# Patient Record
Sex: FEMALE | Race: WHITE | NOT HISPANIC OR LATINO | Employment: OTHER | URBAN - METROPOLITAN AREA
[De-identification: names, ages, dates, MRNs, and addresses within clinical notes are randomized per-mention and may not be internally consistent; named-entity substitution may affect disease eponyms.]

---

## 2018-10-16 ENCOUNTER — APPOINTMENT (OUTPATIENT)
Dept: RADIOLOGY | Facility: MEDICAL CENTER | Age: 53
End: 2018-10-16
Attending: INTERNAL MEDICINE
Payer: COMMERCIAL

## 2018-11-06 ENCOUNTER — HOSPITAL ENCOUNTER (OUTPATIENT)
Dept: RADIOLOGY | Facility: MEDICAL CENTER | Age: 53
End: 2018-11-06
Attending: INTERNAL MEDICINE
Payer: COMMERCIAL

## 2018-11-06 VITALS
HEIGHT: 63 IN | DIASTOLIC BLOOD PRESSURE: 74 MMHG | SYSTOLIC BLOOD PRESSURE: 118 MMHG | RESPIRATION RATE: 18 BRPM | WEIGHT: 120 LBS | HEART RATE: 80 BPM | OXYGEN SATURATION: 94 % | BODY MASS INDEX: 21.26 KG/M2

## 2018-11-06 DIAGNOSIS — I42.9 CARDIOMYOPATHY, UNSPECIFIED TYPE (HCC): ICD-10-CM

## 2018-11-06 DIAGNOSIS — Z01.818 PRE-TRANSPLANT EVALUATION FOR HEART TRANSPLANT: ICD-10-CM

## 2018-11-06 PROCEDURE — 75574 CT ANGIO HRT W/3D IMAGE: CPT

## 2018-11-06 PROCEDURE — 700101 HCHG RX REV CODE 250: Performed by: RADIOLOGY

## 2018-11-06 PROCEDURE — 700117 HCHG RX CONTRAST REV CODE 255: Performed by: INTERNAL MEDICINE

## 2018-11-06 RX ORDER — METOPROLOL TARTRATE 100 MG/1
100 TABLET ORAL ONCE
Status: DISCONTINUED | OUTPATIENT
Start: 2018-11-06 | End: 2018-11-07 | Stop reason: HOSPADM

## 2018-11-06 RX ORDER — METOPROLOL TARTRATE 1 MG/ML
INJECTION, SOLUTION INTRAVENOUS
Status: DISCONTINUED
Start: 2018-11-06 | End: 2018-11-07 | Stop reason: HOSPADM

## 2018-11-06 RX ORDER — METOPROLOL TARTRATE 1 MG/ML
5 INJECTION, SOLUTION INTRAVENOUS
Status: COMPLETED | OUTPATIENT
Start: 2018-11-06 | End: 2018-11-06

## 2018-11-06 RX ORDER — NITROGLYCERIN 0.4 MG/1
0.4 TABLET SUBLINGUAL ONCE
Status: DISCONTINUED | OUTPATIENT
Start: 2018-11-06 | End: 2018-11-07 | Stop reason: HOSPADM

## 2018-11-06 RX ORDER — METOPROLOL TARTRATE 1 MG/ML
5 INJECTION, SOLUTION INTRAVENOUS
Status: DISCONTINUED | OUTPATIENT
Start: 2018-11-06 | End: 2018-11-07 | Stop reason: HOSPADM

## 2018-11-06 RX ADMIN — METOPROLOL TARTRATE 5 MG: 5 INJECTION INTRAVENOUS at 15:45

## 2018-11-06 RX ADMIN — METOPROLOL TARTRATE 5 MG: 5 INJECTION INTRAVENOUS at 15:50

## 2018-11-06 RX ADMIN — IOHEXOL 100 ML: 350 INJECTION, SOLUTION INTRAVENOUS at 16:31

## 2018-11-06 RX ADMIN — METOPROLOL TARTRATE 5 MG: 5 INJECTION INTRAVENOUS at 16:00

## 2018-11-06 ASSESSMENT — PAIN SCALES - GENERAL: PAINLEVEL_OUTOF10: 0

## 2018-11-07 NOTE — PROGRESS NOTES
Patient tolerated procedure well without incident. VSS. Discharged to [facility] in stable condition with instructions understood.

## 2019-01-15 ENCOUNTER — TELEPHONE (OUTPATIENT)
Dept: TRANSPLANT | Facility: MEDICAL CENTER | Age: 54
End: 2019-01-15

## 2019-01-15 NOTE — TELEPHONE ENCOUNTER
Spoke to Holley this morning. Introduced pt to the new transplant clinic and my role within the program. Followup scheduled with Dr. Cain. Pt states that she has recent labs via [facility]. Sangita from [facility] to fax over results. Will review with Dr. Cain and order any additional labs/ imaging before followup appointment.

## 2019-01-17 DIAGNOSIS — K76.0 FATTY (CHANGE OF) LIVER, NOT ELSEWHERE CLASSIFIED: ICD-10-CM

## 2019-01-17 DIAGNOSIS — K74.60 HEPATIC CIRRHOSIS, UNSPECIFIED HEPATIC CIRRHOSIS TYPE, UNSPECIFIED WHETHER ASCITES PRESENT (HCC): ICD-10-CM

## 2019-01-17 NOTE — TELEPHONE ENCOUNTER
"Called pt to inform her that orders have been placed for labs and that she will need to get labs done before her appointment with Dr. Cain. I informed Holley that I was able to get her recent labs from [facility], but they were not the same labs needed by Dr. Cain. Pt was confused because she \"had labs done while she was in [place]\". Re-educated patient about meld scores and how they related with recurrent testing.       Pt was also informed that she will need an updated CT scan. Pt states that she is scheduled to have a scan done with [facility] next week.    This RN called [facility] to inquire about the scheduled testing. Pt is scheduled for an abdominal ultrasound. Pt to be updated that she will possibly need a CT scan, not an ultrasound.     Pt called back and informed that the scans are not the same. Pt agreed to have the labs and imaging done that is needed.  to contact patient to work out a time for imaging. Pt verbalized understanding. All questions and concerns addressed.   "

## 2019-01-22 ENCOUNTER — HOSPITAL ENCOUNTER (OUTPATIENT)
Dept: RADIOLOGY | Facility: MEDICAL CENTER | Age: 54
End: 2019-01-22
Attending: INTERNAL MEDICINE
Payer: COMMERCIAL

## 2019-01-22 ENCOUNTER — HOSPITAL ENCOUNTER (OUTPATIENT)
Dept: LAB | Facility: MEDICAL CENTER | Age: 54
End: 2019-01-22
Attending: INTERNAL MEDICINE
Payer: COMMERCIAL

## 2019-01-22 DIAGNOSIS — K76.0 FATTY (CHANGE OF) LIVER, NOT ELSEWHERE CLASSIFIED: ICD-10-CM

## 2019-01-22 DIAGNOSIS — K74.60 HEPATIC CIRRHOSIS, UNSPECIFIED HEPATIC CIRRHOSIS TYPE, UNSPECIFIED WHETHER ASCITES PRESENT (HCC): ICD-10-CM

## 2019-01-22 LAB
ALBUMIN SERPL BCP-MCNC: 3.9 G/DL (ref 3.2–4.9)
ALBUMIN/GLOB SERPL: 1.1 G/DL
ALP SERPL-CCNC: 188 U/L (ref 30–99)
ALT SERPL-CCNC: 17 U/L (ref 2–50)
ANION GAP SERPL CALC-SCNC: 9 MMOL/L (ref 0–11.9)
AST SERPL-CCNC: 51 U/L (ref 12–45)
BASOPHILS # BLD AUTO: 0.9 % (ref 0–1.8)
BASOPHILS # BLD: 0.07 K/UL (ref 0–0.12)
BILIRUB SERPL-MCNC: 2.8 MG/DL (ref 0.1–1.5)
BUN SERPL-MCNC: 10 MG/DL (ref 8–22)
CALCIUM SERPL-MCNC: 9.9 MG/DL (ref 8.5–10.5)
CHLORIDE SERPL-SCNC: 104 MMOL/L (ref 96–112)
CO2 SERPL-SCNC: 24 MMOL/L (ref 20–33)
CREAT SERPL-MCNC: 0.58 MG/DL (ref 0.5–1.4)
EOSINOPHIL # BLD AUTO: 0.09 K/UL (ref 0–0.51)
EOSINOPHIL NFR BLD: 1.2 % (ref 0–6.9)
ERYTHROCYTE [DISTWIDTH] IN BLOOD BY AUTOMATED COUNT: 62 FL (ref 35.9–50)
GLOBULIN SER CALC-MCNC: 3.6 G/DL (ref 1.9–3.5)
GLUCOSE SERPL-MCNC: 128 MG/DL (ref 65–99)
HCT VFR BLD AUTO: 37.2 % (ref 37–47)
HGB BLD-MCNC: 12.3 G/DL (ref 12–16)
IMM GRANULOCYTES # BLD AUTO: 0.02 K/UL (ref 0–0.11)
IMM GRANULOCYTES NFR BLD AUTO: 0.3 % (ref 0–0.9)
INR PPP: 1.31 (ref 0.87–1.13)
LYMPHOCYTES # BLD AUTO: 1.83 K/UL (ref 1–4.8)
LYMPHOCYTES NFR BLD: 24.4 % (ref 22–41)
MCH RBC QN AUTO: 34.6 PG (ref 27–33)
MCHC RBC AUTO-ENTMCNC: 33.1 G/DL (ref 33.6–35)
MCV RBC AUTO: 104.8 FL (ref 81.4–97.8)
MONOCYTES # BLD AUTO: 1.07 K/UL (ref 0–0.85)
MONOCYTES NFR BLD AUTO: 14.2 % (ref 0–13.4)
NEUTROPHILS # BLD AUTO: 4.43 K/UL (ref 2–7.15)
NEUTROPHILS NFR BLD: 59 % (ref 44–72)
NRBC # BLD AUTO: 0 K/UL
NRBC BLD-RTO: 0 /100 WBC
PLATELET # BLD AUTO: 165 K/UL (ref 164–446)
PMV BLD AUTO: 9.6 FL (ref 9–12.9)
POTASSIUM SERPL-SCNC: 3.8 MMOL/L (ref 3.6–5.5)
PROT SERPL-MCNC: 7.5 G/DL (ref 6–8.2)
PROTHROMBIN TIME: 16.4 SEC (ref 12–14.6)
RBC # BLD AUTO: 3.55 M/UL (ref 4.2–5.4)
SODIUM SERPL-SCNC: 137 MMOL/L (ref 135–145)
WBC # BLD AUTO: 7.5 K/UL (ref 4.8–10.8)

## 2019-01-22 PROCEDURE — 700117 HCHG RX CONTRAST REV CODE 255: Performed by: INTERNAL MEDICINE

## 2019-01-22 PROCEDURE — 85610 PROTHROMBIN TIME: CPT

## 2019-01-22 PROCEDURE — 36415 COLL VENOUS BLD VENIPUNCTURE: CPT

## 2019-01-22 PROCEDURE — 85025 COMPLETE CBC W/AUTO DIFF WBC: CPT

## 2019-01-22 PROCEDURE — 80053 COMPREHEN METABOLIC PANEL: CPT

## 2019-01-22 PROCEDURE — 74170 CT ABD WO CNTRST FLWD CNTRST: CPT

## 2019-01-22 RX ADMIN — IOHEXOL 100 ML: 350 INJECTION, SOLUTION INTRAVENOUS at 09:38

## 2019-01-23 ENCOUNTER — OFFICE VISIT (OUTPATIENT)
Dept: TRANSPLANT | Facility: MEDICAL CENTER | Age: 54
End: 2019-01-23
Payer: COMMERCIAL

## 2019-01-23 VITALS
WEIGHT: 130 LBS | OXYGEN SATURATION: 97 % | RESPIRATION RATE: 16 BRPM | DIASTOLIC BLOOD PRESSURE: 58 MMHG | SYSTOLIC BLOOD PRESSURE: 105 MMHG | TEMPERATURE: 99.6 F | HEART RATE: 78 BPM | BODY MASS INDEX: 23.03 KG/M2

## 2019-01-23 DIAGNOSIS — Z95.828 S/P TIPS (TRANSJUGULAR INTRAHEPATIC PORTOSYSTEMIC SHUNT): ICD-10-CM

## 2019-01-23 DIAGNOSIS — E44.1 MILD PROTEIN-CALORIE MALNUTRITION (HCC): ICD-10-CM

## 2019-01-23 DIAGNOSIS — K70.30 ALCOHOLIC CIRRHOSIS OF LIVER WITHOUT ASCITES (HCC): Primary | ICD-10-CM

## 2019-01-23 PROCEDURE — 99214 OFFICE O/P EST MOD 30 MIN: CPT | Performed by: INTERNAL MEDICINE

## 2019-01-23 RX ORDER — PAROXETINE HYDROCHLORIDE 20 MG/1
20 TABLET, FILM COATED ORAL DAILY
COMMUNITY
Start: 2017-12-18

## 2019-01-23 RX ORDER — SIMVASTATIN 20 MG
20 TABLET ORAL DAILY
COMMUNITY

## 2019-01-23 RX ORDER — FUROSEMIDE 40 MG/1
40 TABLET ORAL DAILY
COMMUNITY
Start: 2018-02-12

## 2019-01-23 RX ORDER — MONTELUKAST SODIUM 10 MG/1
10 TABLET ORAL DAILY
COMMUNITY
Start: 2017-10-17

## 2019-01-23 RX ORDER — SPIRONOLACTONE 100 MG/1
100 TABLET, FILM COATED ORAL DAILY
COMMUNITY
Start: 2018-02-12

## 2019-01-23 NOTE — PROGRESS NOTES
"  Liver Transplant follow up Clinic Note    1/23/2019     Referring Provider: Brant Hernandez MD, Dr. Melendez  Primary Care Provider: Jarret Morrison M.D.    History of Present Illness:  Holley Maagña is a 53 y.o. female who presents today for follow up in the transplant hepatology clinic. She is unaccompanied      She underwent OLTE due to decompensated ETOH cirrhosis with refractory ascites and severe muscle wasting, s/p TIPS at [facility] 12/31/17  With coils of GV, Last para feb 2018    Pt was presented for consideration of listing 3/2018 and was deferred for the following:   \" cardiology consult for transplant clearance. Psychosocially she is not cleared for transplant. To improve her candidacy:  1. She must completely stop drinking alcohol, non prescription drugs and must demonstrate 6 months of abstinence.   2. She must engage in relapse prevention plan such as  AA meetings and submit signed logs or sobriety counseling at least twice weekly.  3. She must have random toxicology screens with negative results, and provide medical marijuana card if she continues to use marijuana. \"    Pt has not been seen by liver transplant since 3/2018    In the interim:   She was seen by cardiology at [facility], who recommended CT coronary angiogram due to cardiomyopathy, but this test was non diagnostic    Pt states she has been seen by her primary GI Dr. Melendez and has had and US to verify TIPS patency     She has not had any hospitalizations since last seen, no HE, no falls  She chris'd at 95lbs after TIPS, but has had slow weight gain, not fluid.  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     She has not been attending AA regularly, partly due to SW at [facility] having left and pt not given adequate f/u. She states she has attended AA monthly - but did not have any signatures    She has good energy, notes easy bruising, no CP or " SOB  Notices some disequilibrium and mild neuropathy in feet BL    Review of Systems:   Complete organ system review performed, positives noted in HPI    Past Medical History:  ETOH liver disease  Ascites  S/p TIPS,  Hx of hyponatremia with seizures  No abdominal surgeries    Past Surgical History:  none      Current Outpatient Prescriptions:   •  furosemide (LASIX) 40 MG Tab, Take 40 mg by mouth every day., Disp: , Rfl:   •  montelukast (SINGULAIR) 10 MG Tab, Take 10 mg by mouth every day., Disp: , Rfl:   •  PARoxetine (PAXIL) 20 MG Tab, Take 20 mg by mouth every day., Disp: , Rfl:   •  spironolactone (ALDACTONE) 100 MG Tab, Take 100 mg by mouth every day., Disp: , Rfl:   •  simvastatin (ZOCOR) 20 MG Tab, Take 20 mg by mouth every day., Disp: , Rfl:     Need to verify diuretic dosing -   MVI  Probiotics  ASA 81mg  cavediolol - BID - need to verify dose    Allergies: No Known Allergies      Social History:   Soc:  Worked for [employer] as an  - terminated November 2016  Has not worked since then  No TOB, never smoker  Liver  son daughter in law and granddaughter  Not clear about amount of ETOH she drank, seems to minimize, kept drinking a few drinks even after told of liver disease     Caregivers would be:   Brother who lives in [place] would relocate and live with pt for the initial 6 weeks.and many friends and adult children to help      Family History:  Unknown, pt is adopted    Physical Examination:  Vitals:    01/23/19 1009   BP: 105/58   Pulse: 78   Resp: 16   Temp: 37.6 °C (99.6 °F)   SpO2: 97%     Body mass index is 23.03 kg/m².    Consitutional: alert, pleasant, cooperative  Eyes: anicteric, normal conjunctivae, chronically ill appearing with proximal muscle wating  HENT: NET oropharynx clear, normal dentition  Neck: supple, no jvd, no hepatojugular reflex, no MANDI  Cardiovascular: normal rate and rhythm, normal heart sounds, TAMY LSB  Pulmonary: normal breath sounds, normal respiratory  effort  Abdominal: soft, nontender, nondistended, bowel sounds present, no hepatosplenomegaly, no ascites  Musculoskeletal: no edema  Neurological: alert, oriented x 3, no focal deficits, no asterixis, mildly wide based gait and station  Skin: no jaundice, no spider angiomata, no rashes, no palmar erythema   Psych: normal mood and affect    Labs:    CMP:   Results for SUSAN COLES (MRN [id number]) as of 1/23/2019 09:38   Ref. Range 1/22/2019 08:59   Sodium Latest Ref Range: 135 - 145 mmol/L 137   Potassium Latest Ref Range: 3.6 - 5.5 mmol/L 3.8   Chloride Latest Ref Range: 96 - 112 mmol/L 104   Co2 Latest Ref Range: 20 - 33 mmol/L 24   Anion Gap Latest Ref Range: 0.0 - 11.9  9.0   Glucose Latest Ref Range: 65 - 99 mg/dL 128 (H)   Bun Latest Ref Range: 8 - 22 mg/dL 10   Creatinine Latest Ref Range: 0.50 - 1.40 mg/dL 0.58   GFR If  Latest Ref Range: >60 mL/min/1.73 m 2 >60   GFR If Non  Latest Ref Range: >60 mL/min/1.73 m 2 >60   Calcium Latest Ref Range: 8.5 - 10.5 mg/dL 9.9   AST(SGOT) Latest Ref Range: 12 - 45 U/L 51 (H)   ALT(SGPT) Latest Ref Range: 2 - 50 U/L 17   Alkaline Phosphatase Latest Ref Range: 30 - 99 U/L 188 (H)   Total Bilirubin Latest Ref Range: 0.1 - 1.5 mg/dL 2.8 (H)   Albumin Latest Ref Range: 3.2 - 4.9 g/dL 3.9   Total Protein Latest Ref Range: 6.0 - 8.2 g/dL 7.5   Globulin Latest Ref Range: 1.9 - 3.5 g/dL 3.6 (H)   Results for SUSAN COLES (MRN [id number]) as of 1/23/2019 09:38   Ref. Range 1/22/2019 08:59   WBC Latest Ref Range: 4.8 - 10.8 K/uL 7.5   RBC Latest Ref Range: 4.20 - 5.40 M/uL 3.55 (L)   Hemoglobin Latest Ref Range: 12.0 - 16.0 g/dL 12.3   Hematocrit Latest Ref Range: 37.0 - 47.0 % 37.2   MCV Latest Ref Range: 81.4 - 97.8 fL 104.8 (H)   MCH Latest Ref Range: 27.0 - 33.0 pg 34.6 (H)   MCHC Latest Ref Range: 33.6 - 35.0 g/dL 33.1 (L)   RDW Latest Ref Range: 35.9 - 50.0 fL 62.0 (H)   Platelet Count Latest Ref Range: 164 - 446 K/uL 165     AFP  7.4    PT/INR:   Lab Results   Component Value Date/Time    PROTHROMBTM 16.4 (H) 01/22/2019 08:59 AM    INR 1.31 (H) 01/22/2019 08:59 AM       Endoscopy:      Imaging/Studies:  CT abdomen 1/22/19  Findings suspicious for large posterior medial right lobe of the liver mass and associated portal venous branch thrombosis. MRI is consideration for further evaluation.  Cannot exclude infiltrate masses within the dome of the liver and anterior left lobe of the liver.  Opacified portacaval shunt. Splenic and gastroesophageal varices demonstrated.  No free fluid within the mid and upper abdomen.  5.7 mm hypodensity right kidney likely renal cyst. No hydronephrosis.  Cholelithiasis.  Atherosclerotic changes.    Assessment: Holley Magaña is a 53 y.o. female who presents today for follow up in the liver transplant clinic, pt currently deferred.  1. Cirrhosis. MELD-Na 13 - discussed pt has low MELD, has stability of liver disease with TIPS and ETOH cessation. Will complete OLTE  2. S/p TIPS - good control of fluid  3. ETOH abstinence -  online AA  4. Hepatoma screening - last scan 1/22/19 -  5. Cardiomyopathy - currently remains asymptomatic - has not completed full pre transplant cardiac evaluation.  Plan:  Abnormal finding on CT from [facility], query malignancy, vs nodular regeneration    Needs ABD US to evaluate TIPs flow - outside records [facility]    Needs to f/u with SW and AA logs -     Need to discuss with cardiology further evaluation of cardiomyopathy for transplant evaluation given inadequacy of CT coronary angiogram  Will repeat TTE now to re evaluate RVSP, given last ECHO was with ascites/pleural effusion    Needs nutrition f/u    Pt will be rediscussed at [facility] selection after ECHO and discussion with [facility] cardiology.     Currently MELD remains low - but pt remains frail with potential for decompensation. Discussed with pt that should she complete her OLTevaluation and have adequate cardiac clearance, she  could be listed with low MELD, or followed closely not on the list, and only listed if she shows future signs of decompensation      Makenzie Cain M.D.  Clinical

## 2019-01-23 NOTE — PATIENT INSTRUCTIONS
Follow-Up Action Items from Today’s Visit      What we discussed today:  1. Summary of Visit: Dr. Cain and Addie will contact you regarding future studies and transplant listing    2. Medication Changes: none   3. Blood work every 3 months   4. Liver cancer screening with CT scan of the liver every 6 months   5. Please have Dr. Melendez send us the Ultrasound and all notes from his visit with you          Please do the following before your next visit:  1. Labs: Get your labs done every 3 months   2. Radiology studies needed: CT scan every 6 months for cancer screening  3. Next Appointment: Please schedule in 3 months          For any questions, please call the Liver Transplant Office to reach your [facility] coordinator: [phone], For urgent issues after business hours or on weekends, please call the on-call [facility] through the [facility] : [phone]    To be best prepared for your next visit, please:  • Arrive 15 minutes before your next clinic appointment.  • Write down questions for your doctor in the space provided below   • Keep an updated list of your medications with you at all times. If you have any new medications or if you need any additional refills before your next visit, please call our office to inform us.   • Call our office if you have any tests done outside of [facility] so we can review with you.  • Call our office if you are hospitalized or go to the Emergency Department.  • Call our office before your visit if there are any forms such as disability applications that you need completed.       Your top questions for next visit:    1. _____________________________________________________________________    2. _____________________________________________________________________    3. _____________________________________________________________________

## 2019-01-24 ENCOUNTER — TELEPHONE (OUTPATIENT)
Dept: TRANSPLANT | Facility: MEDICAL CENTER | Age: 54
End: 2019-01-24

## 2019-01-24 NOTE — TELEPHONE ENCOUNTER
Patient called to confirm medication list from yesterday. Reviewed MD recommendations from clinic visit yesterday. Pt states that she has called [facility] to send records to [facility]. Follow-up appointment scheduled with patient. Will follow-up with patient regarding additional outstanding needs.

## 2019-01-24 NOTE — PROGRESS NOTES
Pt seen in clinic. Vital signs assessed, medical/ surgical history taken, current medications reviewed, and current complaints discussed. Pt seen by Dr. Cain and recommendations include:  Ordering new iron panel and an echo with bubble study.     This RN is also going to followup with the patients current [place]  assigned to her case to review recommendations regarding how many AA meetings are needed per week. Patients states that the online AA meetings that were provided for her to take are costing money. Will also follow-up regarding that concern with SW.    Pt [facility]. Pt advised to sign ANNALISA with them to release her records. This RN to obtain outside records from [facility].    Pt to f/u in clinic with Dr. Cain in 3 months.     Orders placed per MD request.

## 2019-01-28 ENCOUNTER — TELEPHONE (OUTPATIENT)
Dept: TRANSPLANT | Facility: MEDICAL CENTER | Age: 54
End: 2019-01-28

## 2019-01-28 NOTE — TELEPHONE ENCOUNTER
Previous to the call:    This RN had spoke with Mely ([place] ) about the questions that had occurred during the followup visit. Holley was unsure how many AA meetings were required per week and had mentioned that the links that were sent to her via email were not free. Mely states that Daya is required to attend two meetings a week. She will send Holley an email to clarify the  needs and requirements and provide an additional link to online AA meetings.    This RN called Holley and left a voicemail with the above information. Contact information was provided and patient encouraged to call back with any further questions or concerns.

## 2019-02-06 ENCOUNTER — TELEPHONE (OUTPATIENT)
Dept: TRANSPLANT | Facility: MEDICAL CENTER | Age: 54
End: 2019-02-06

## 2019-02-06 NOTE — TELEPHONE ENCOUNTER
Holley called in today to confirm tests to be complete and the days/ times. She states she will be getting an abdominal ultrasound via her GI MD. Requested patient have results faxed to the RN after completion. Pt also states that she has not begun the online AA meetings. Pt advised to begin as soon as she can as she will not be listed until she is compliant with SW recommendations. Pt verbalized understanding. All questions and concerns addressed. Pt encouraged to call with any questions or concerns.

## 2019-02-20 DIAGNOSIS — K70.31 ALCOHOLIC CIRRHOSIS OF LIVER WITH ASCITES (HCC): ICD-10-CM

## 2019-02-25 NOTE — PROGRESS NOTES
Spoke to Holley to discuss testing needing to be complete. Pt states that she rescheduled her echo for Thursday and she will get labs done during that visit. Informed her that the radiology conference has recommended adding an MRI to further assess her liver and that a  will be reaching out to her to schedule. Additionally, pt states that she has been compliant with AA meetings as directed by the .

## 2019-02-28 ENCOUNTER — APPOINTMENT (OUTPATIENT)
Dept: CARDIOLOGY | Facility: MEDICAL CENTER | Age: 54
End: 2019-02-28
Attending: INTERNAL MEDICINE
Payer: COMMERCIAL

## 2019-04-02 ENCOUNTER — HOSPITAL ENCOUNTER (OUTPATIENT)
Dept: RADIOLOGY | Facility: MEDICAL CENTER | Age: 54
End: 2019-04-02
Attending: INTERNAL MEDICINE
Payer: MEDICARE

## 2019-04-02 ENCOUNTER — HOSPITAL ENCOUNTER (OUTPATIENT)
Dept: CARDIOLOGY | Facility: MEDICAL CENTER | Age: 54
End: 2019-04-02
Attending: INTERNAL MEDICINE
Payer: MEDICARE

## 2019-04-02 DIAGNOSIS — K70.30 ALCOHOLIC CIRRHOSIS OF LIVER WITHOUT ASCITES (HCC): ICD-10-CM

## 2019-04-02 DIAGNOSIS — K70.31 ALCOHOLIC CIRRHOSIS OF LIVER WITH ASCITES (HCC): ICD-10-CM

## 2019-04-02 LAB
LV EJECT FRACT  99904: 70
LV EJECT FRACT MOD 2C 99903: 72.48
LV EJECT FRACT MOD 4C 99902: 73.28
LV EJECT FRACT MOD BP 99901: 72.71

## 2019-04-02 PROCEDURE — A9585 GADOBUTROL INJECTION: HCPCS | Performed by: INTERNAL MEDICINE

## 2019-04-02 PROCEDURE — 700117 HCHG RX CONTRAST REV CODE 255: Performed by: INTERNAL MEDICINE

## 2019-04-02 PROCEDURE — 93306 TTE W/DOPPLER COMPLETE: CPT

## 2019-04-02 PROCEDURE — 74183 MRI ABD W/O CNTR FLWD CNTR: CPT

## 2019-04-02 PROCEDURE — 93306 TTE W/DOPPLER COMPLETE: CPT | Mod: 26 | Performed by: INTERNAL MEDICINE

## 2019-04-02 RX ORDER — GADOBUTROL 604.72 MG/ML
6 INJECTION INTRAVENOUS ONCE
Status: COMPLETED | OUTPATIENT
Start: 2019-04-02 | End: 2019-04-02

## 2019-04-02 RX ADMIN — GADOBUTROL 7.5 ML: 604.72 INJECTION INTRAVENOUS at 11:21

## 2019-04-04 DIAGNOSIS — K70.30 ALCOHOLIC CIRRHOSIS, UNSPECIFIED WHETHER ASCITES PRESENT (HCC): ICD-10-CM

## 2019-04-10 ENCOUNTER — OFFICE VISIT (OUTPATIENT)
Dept: TRANSPLANT | Facility: MEDICAL CENTER | Age: 54
End: 2019-04-10
Payer: MEDICARE

## 2019-04-10 VITALS
HEART RATE: 93 BPM | DIASTOLIC BLOOD PRESSURE: 70 MMHG | WEIGHT: 128.75 LBS | RESPIRATION RATE: 18 BRPM | BODY MASS INDEX: 22.81 KG/M2 | OXYGEN SATURATION: 97 % | TEMPERATURE: 98.3 F | HEIGHT: 63 IN | SYSTOLIC BLOOD PRESSURE: 142 MMHG

## 2019-04-10 DIAGNOSIS — E44.1 MILD PROTEIN-CALORIE MALNUTRITION (HCC): ICD-10-CM

## 2019-04-10 DIAGNOSIS — K76.81 HEPATOPULMONARY SYNDROME (HCC): ICD-10-CM

## 2019-04-10 DIAGNOSIS — Z95.828 S/P TIPS (TRANSJUGULAR INTRAHEPATIC PORTOSYSTEMIC SHUNT): ICD-10-CM

## 2019-04-10 DIAGNOSIS — K70.30 ALCOHOLIC CIRRHOSIS OF LIVER WITHOUT ASCITES (HCC): ICD-10-CM

## 2019-04-10 PROCEDURE — 99214 OFFICE O/P EST MOD 30 MIN: CPT | Performed by: INTERNAL MEDICINE

## 2019-04-10 RX ORDER — CARVEDILOL 3.12 MG/1
3.12 TABLET ORAL 2 TIMES DAILY
COMMUNITY
Start: 2018-05-14 | End: 2019-05-14

## 2019-04-10 RX ORDER — ACETAMINOPHEN 500 MG
500-1000 TABLET ORAL EVERY 6 HOURS PRN
COMMUNITY

## 2019-04-10 NOTE — LETTER
Referral information sent to the following:  [unfilled]   [unfilled]   [unfilled]  [unfilled]  [unfilled]     [unfilled]     @[facility]@    Patient Care Coordination notes:

## 2019-04-10 NOTE — PATIENT INSTRUCTIONS
Lets see if you can have less diuretics and still have good control of your swelling - take 1/2 tablet of lasix (furosemide) and 1/2 tab of spironolactone (aldactone)    Please restart lactulose daily - to prevent hepatic encephalopathy    You have signs of vascular shunting through your lungs, about 5-10% of the blood does not  oxygen on its way through the lungs, this is due to the cirrhosis. You will need to get an ABG (arterial blood gas) to further assess this, and we will follow this closely. If this progresses you really need to be on the transplant list and would move up higher.     Please submit your AA logs to the [facility] , this is the last piece for consideration of transplant listing.     Your next MRI should be done in October 2019  We will order this at your next visit.

## 2019-04-10 NOTE — PROGRESS NOTES
"  Liver Transplant follow up Clinic Note    4/9/19    Referring Provider: Brant Hernandez MD, Dr. Melendez  Primary Care Provider: Jarret Morrison M.D.    History of Present Illness:  Holley Magaña is a 53 y.o. female who presents today for follow up in the transplant hepatology clinic. She is unaccompanied      She underwent OLTE due to decompensated ETOH cirrhosis with refractory ascites and severe muscle wasting, s/p TIPS at [facility] 12/31/17  With coils of GV, Last para feb 2018    Pt was presented for consideration of listing 3/2018 and was deferred for the following:   \" cardiology consult for transplant clearance. Psychosocially she is not cleared for transplant. To improve her candidacy:  1. She must completely stop drinking alcohol, non prescription drugs and must demonstrate 6 months of abstinence.   2. She must engage in relapse prevention plan such as  AA meetings and submit signed logs or sobriety counseling at least twice weekly.  3. She must have random toxicology screens with negative results, and provide medical marijuana card if she continues to use marijuana. \"    Was seen in clinic 1/2019:  She has not had any hospitalizations since last seen, no HE, no falls  She chris'd at 95lbs after TIPS, but has had slow weight gain, not fluid.  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     Currently going to [facility] AA meetings - trying to get there 2 times per week - has attended 6  Has not sent logs to  but plans to do so - states that she can ask the leaders of AA to get these for her and will send to     She ran out of lactulose and has not resumed as she was not sure she still needed to take it.     Had f/u MRI and ECHO as requested    Review of Systems:   Complete organ system review performed, positives noted in HPI    Past Medical History:  ETOH liver disease  Ascites  S/p TIPS,  Hx of " hyponatremia with seizures  No abdominal surgeries    Past Surgical History:  none      Current Outpatient Prescriptions:   •  carvedilol (COREG) 3.125 MG Tab, Take 3.125 mg by mouth 2 Times a Day., Disp: , Rfl:   •  Multiple Vitamins-Minerals (MULTIVITAMIN ADULT PO), Take  by mouth., Disp: , Rfl:   •  acetaminophen (TYLENOL) 500 MG Tab, Take 500-1,000 mg by mouth every 6 hours as needed., Disp: , Rfl:   •  PREBIOTIC PRODUCT PO, Take  by mouth., Disp: , Rfl:   •  Probiotic Product (PROBIOTIC-10 PO), Take  by mouth., Disp: , Rfl:   •  furosemide (LASIX) 40 MG Tab, Take 40 mg by mouth every day., Disp: , Rfl:   •  montelukast (SINGULAIR) 10 MG Tab, Take 10 mg by mouth every day., Disp: , Rfl:   •  PARoxetine (PAXIL) 20 MG Tab, Take 20 mg by mouth every day., Disp: , Rfl:   •  simvastatin (ZOCOR) 20 MG Tab, Take 20 mg by mouth every day., Disp: , Rfl:   •  spironolactone (ALDACTONE) 100 MG Tab, Take 100 mg by mouth every day., Disp: , Rfl:         Allergies: No Known Allergies      Social History:   Soc:  Worked for [employer] as an  - terminated November 2016  Has not worked since then  No TOB, never smoker  Lives w  son daughter in law and granddaughter  Not clear about amount of ETOH she drank, seems to minimize, kept drinking a few drinks even after told of liver disease     Caregivers would be:   Brother who lives in [place] would relocate and live with pt for the initial 6 weeks.and many friends and adult children to help      Family History:  Unknown, pt is adopted    Physical Examination:  Vitals:    04/10/19 1153   BP: 142/70   Pulse: 93   Resp: 18   Temp: 36.8 °C (98.3 °F)   SpO2: 97%     Body mass index is 22.81 kg/m².  91% with ambulation    Consitutional: alert, pleasant, cooperative  Eyes: anicteric, normal conjunctivae, chronically ill appearing with proximal muscle wating  HENT: NET oropharynx clear, normal dentition  Neck: supple, no jvd, no hepatojugular reflex, no  MANDI  Cardiovascular: normal rate and rhythm, normal heart sounds, TAMY LSB  Pulmonary: normal breath sounds, normal respiratory effort  Abdominal: soft, nontender, nondistended, bowel sounds present, no + splenomegaly, no ascites  Musculoskeletal: no edema  Neurological: alert, oriented x 3, no focal deficits, no asterixis,   Skin: no jaundice, +spider angiomata, no rashes, + palmar erythema   Psych: normal mood and affect    Labs:    Results for SUSAN COLES (MRN [id number]) as of 4/10/2019 12:16   Ref. Range 1/22/2019 08:59   WBC Latest Ref Range: 4.8 - 10.8 K/uL 7.5   RBC Latest Ref Range: 4.20 - 5.40 M/uL 3.55 (L)   Hemoglobin Latest Ref Range: 12.0 - 16.0 g/dL 12.3   Hematocrit Latest Ref Range: 37.0 - 47.0 % 37.2   MCV Latest Ref Range: 81.4 - 97.8 fL 104.8 (H)   MCH Latest Ref Range: 27.0 - 33.0 pg 34.6 (H)   MCHC Latest Ref Range: 33.6 - 35.0 g/dL 33.1 (L)   RDW Latest Ref Range: 35.9 - 50.0 fL 62.0 (H)   Platelet Count Latest Ref Range: 164 - 446 K/uL 165     CMP:    Results for SUSAN COLES (MRN [id number]) as of 4/10/2019 12:16   Ref. Range 1/22/2019 08:59   Sodium Latest Ref Range: 135 - 145 mmol/L 137   Potassium Latest Ref Range: 3.6 - 5.5 mmol/L 3.8   Chloride Latest Ref Range: 96 - 112 mmol/L 104   Co2 Latest Ref Range: 20 - 33 mmol/L 24   Anion Gap Latest Ref Range: 0.0 - 11.9  9.0   Glucose Latest Ref Range: 65 - 99 mg/dL 128 (H)   Bun Latest Ref Range: 8 - 22 mg/dL 10   Creatinine Latest Ref Range: 0.50 - 1.40 mg/dL 0.58   GFR If  Latest Ref Range: >60 mL/min/1.73 m 2 >60   GFR If Non  Latest Ref Range: >60 mL/min/1.73 m 2 >60   Calcium Latest Ref Range: 8.5 - 10.5 mg/dL 9.9   AST(SGOT) Latest Ref Range: 12 - 45 U/L 51 (H)   ALT(SGPT) Latest Ref Range: 2 - 50 U/L 17   Alkaline Phosphatase Latest Ref Range: 30 - 99 U/L 188 (H)   Total Bilirubin Latest Ref Range: 0.1 - 1.5 mg/dL 2.8 (H)   Albumin Latest Ref Range: 3.2 - 4.9 g/dL 3.9   Total Protein Latest Ref  Range: 6.0 - 8.2 g/dL 7.5     AFP 7.4    PT/INR:   Lab Results   Component Value Date/Time    PROTHROMBTM 16.4 (H) 01/22/2019 08:59 AM    INR 1.31 (H) 01/22/2019 08:59 AM         Imaging/Studies:  MRI 4/2/19  1.  There are morphologic changes and heterogeneous enhancement of the liver consistent with the history of cirrhosis.  2.  There are no suspicious arterial phase enhancing lesions with washout.  3.  There is a TIPS in place.  4.  There is a perfusion abnormality with thrombosed peripheral vessels in the posterior superior right lobe of the liver which could be branches of distal portal vein or hepatic vein possibly related to the TIPS.  5.  Other perfusion anomalies are likely related to the underlying cirrhosis.  6.  Vascular structures are otherwise patent as noted above with the tips noted to be patent on the prior CT.  7.  Spleen is normal in size as is the main portal vein.  8.  There are esophageal varices with a recanalized umbilical vein and probable splenic varices near the left renal vein.  9.  There is no ascites.  10.  There is cholelithiasis.    2/2019:   ABD US - patent tips     ECHO 5/2/19  CONCLUSIONS  No prior study is available for comparison.   Left ventricular ejection fraction is visually estimated to be 70%.  Evidence of delayed (>5 beat) right to left shunt suggestive of   pulmonary arterio-venous malformation or extracardiac shunt.  No significant valve disease or flow abnormalities.  RVSP 30mmHg    ODILIA 1/31/2018:  Achieved 88% MPHR  1. There was no new ST segment depression. Arrhythmia induced during stress: none. Arrhythmia noted in recovery: none. The patient did not exhibit any symptoms during drug infusion. Drug infusion stopped due to target heart rate being achieved.  2. Normal LV size with mild systolic dysfunction. Apical and distal septal akinesis. Estimated EF by MOD=45%.  Normal RV size and systolic function.  3. Mild-moderate MR. Mild TR. Estimated RVSP=41mmHg;  RAP=5mmHg.  4. No prior study for comparison.    Assessment: Holley Magaña is a 53 y.o. female who presents today for follow up in the liver transplant clinic, pt currently deferred.  1. Cirrhosis. MELD-Na 13 - discussed pt has low MELD, has stability of liver disease with TIPS and ETOH cessation. OLTE complete - pt has not submitted logs and needs to continue random tox screening  2. S/p TIPS - good control of fluid - instructed pt to decrease diuretics to 20/50, continues to f/u with local GI  3. ETOH abstinence -  online AA - in person - must submit logs  4. Hepatoma screening - last scan 4/19 -  Cardiomyopathy - currently remains asymptomatic -pt has completed cardiac evaluation and is acceptable for surgical transplant    Plan:  Continue hepatoma screening with MRI/CT q 6 months - next due 10/2019    + bubble c/w HPS - pt asymptomatic and normal O2sat but did decrease to 91% with ambulation - will check ABG and monitor - repeat PFT's last done 2/2018 - pt severely deconditioned at the time with FEV1 72%      Needs to f/u with SW and AA logs -     Pt will be rediscussed at [facility] selection after pt has submitted AA logs    Currently MELD remains low - but pt remains frail with potential for decompensation.     Needs to restart lactulose - given TIPS    Makenzie Cain M.D.  Clinical

## 2019-04-15 DIAGNOSIS — K70.30 ALCOHOLIC CIRRHOSIS, UNSPECIFIED WHETHER ASCITES PRESENT (HCC): ICD-10-CM

## 2019-04-17 DIAGNOSIS — K70.30 ALCOHOLIC CIRRHOSIS OF LIVER WITHOUT ASCITES (HCC): ICD-10-CM

## 2019-04-17 DIAGNOSIS — Z13.6 SCREENING FOR CARDIOVASCULAR CONDITION: ICD-10-CM

## 2019-04-17 DIAGNOSIS — K70.31 ALCOHOLIC CIRRHOSIS OF LIVER WITH ASCITES (HCC): ICD-10-CM

## 2019-04-17 DIAGNOSIS — I27.89 OTHER SPECIFIED PULMONARY HEART DISEASES (HCC): ICD-10-CM

## 2019-04-17 DIAGNOSIS — R93.1 ABNORMAL ECHOCARDIOGRAM: ICD-10-CM

## 2019-04-17 DIAGNOSIS — J98.9 RESPIRATORY DISORDER, UNSPECIFIED: ICD-10-CM

## 2019-04-24 ENCOUNTER — TELEPHONE (OUTPATIENT)
Dept: TRANSPLANT | Facility: MEDICAL CENTER | Age: 54
End: 2019-04-24

## 2019-04-24 NOTE — TELEPHONE ENCOUNTER
Called to follow-up with patient regarding PFT scheduled tomorrow. A family member answered the phone and informed me that Holley had passed away. My contact information was provided and the family member encouraged to call if they needed any support navigating the [employer] system.

## 2019-04-25 ENCOUNTER — APPOINTMENT (OUTPATIENT)
Dept: PULMONOLOGY | Facility: MEDICAL CENTER | Age: 54
End: 2019-04-25
Payer: MEDICARE